# Patient Record
Sex: MALE | Race: WHITE | Employment: FULL TIME | URBAN - METROPOLITAN AREA
[De-identification: names, ages, dates, MRNs, and addresses within clinical notes are randomized per-mention and may not be internally consistent; named-entity substitution may affect disease eponyms.]

---

## 2018-09-23 ENCOUNTER — HOSPITAL ENCOUNTER (EMERGENCY)
Age: 63
Discharge: HOME | End: 2018-09-23
Attending: EMERGENCY MEDICINE
Payer: COMMERCIAL

## 2018-09-23 ENCOUNTER — APPOINTMENT (OUTPATIENT)
Dept: GENERAL RADIOLOGY | Age: 63
End: 2018-09-23
Attending: PHYSICIAN ASSISTANT
Payer: COMMERCIAL

## 2018-09-23 VITALS
OXYGEN SATURATION: 100 % | DIASTOLIC BLOOD PRESSURE: 102 MMHG | RESPIRATION RATE: 16 BRPM | WEIGHT: 210 LBS | HEIGHT: 73 IN | BODY MASS INDEX: 27.83 KG/M2 | HEART RATE: 63 BPM | SYSTOLIC BLOOD PRESSURE: 138 MMHG

## 2018-09-23 DIAGNOSIS — T25.222A PARTIAL THICKNESS BURN OF LEFT FOOT, INITIAL ENCOUNTER: Primary | ICD-10-CM

## 2018-09-23 LAB
BASOPHILS # BLD: 0 K/UL (ref 0–0.1)
BASOPHILS NFR BLD: 0 % (ref 0–2)
DIFFERENTIAL METHOD BLD: ABNORMAL
EOSINOPHIL # BLD: 0.1 K/UL (ref 0–0.4)
EOSINOPHIL NFR BLD: 1 % (ref 0–5)
ERYTHROCYTE [DISTWIDTH] IN BLOOD BY AUTOMATED COUNT: 13.2 % (ref 11.6–14.5)
HCT VFR BLD AUTO: 45.3 % (ref 36–48)
HGB BLD-MCNC: 15.7 G/DL (ref 13–16)
LYMPHOCYTES # BLD: 1.2 K/UL (ref 0.9–3.6)
LYMPHOCYTES NFR BLD: 16 % (ref 21–52)
MCH RBC QN AUTO: 31.1 PG (ref 24–34)
MCHC RBC AUTO-ENTMCNC: 34.7 G/DL (ref 31–37)
MCV RBC AUTO: 89.7 FL (ref 74–97)
MONOCYTES # BLD: 1.3 K/UL (ref 0.05–1.2)
MONOCYTES NFR BLD: 17 % (ref 3–10)
NEUTS SEG # BLD: 5 K/UL (ref 1.8–8)
NEUTS SEG NFR BLD: 66 % (ref 40–73)
PLATELET # BLD AUTO: 190 K/UL (ref 135–420)
PMV BLD AUTO: 9.7 FL (ref 9.2–11.8)
RBC # BLD AUTO: 5.05 M/UL (ref 4.7–5.5)
WBC # BLD AUTO: 7.6 K/UL (ref 4.6–13.2)

## 2018-09-23 PROCEDURE — 74011250637 HC RX REV CODE- 250/637: Performed by: PHYSICIAN ASSISTANT

## 2018-09-23 PROCEDURE — 75810000057 HC BURN CR DRS/DEB SM<5%TBSA

## 2018-09-23 PROCEDURE — 99282 EMERGENCY DEPT VISIT SF MDM: CPT

## 2018-09-23 PROCEDURE — 74011000250 HC RX REV CODE- 250: Performed by: PHYSICIAN ASSISTANT

## 2018-09-23 PROCEDURE — 90715 TDAP VACCINE 7 YRS/> IM: CPT | Performed by: PHYSICIAN ASSISTANT

## 2018-09-23 PROCEDURE — 74011250636 HC RX REV CODE- 250/636: Performed by: PHYSICIAN ASSISTANT

## 2018-09-23 PROCEDURE — 85025 COMPLETE CBC W/AUTO DIFF WBC: CPT

## 2018-09-23 PROCEDURE — 73630 X-RAY EXAM OF FOOT: CPT

## 2018-09-23 PROCEDURE — 90471 IMMUNIZATION ADMIN: CPT

## 2018-09-23 RX ORDER — CEPHALEXIN 250 MG/1
500 CAPSULE ORAL
Status: COMPLETED | OUTPATIENT
Start: 2018-09-23 | End: 2018-09-23

## 2018-09-23 RX ORDER — CEPHALEXIN 500 MG/1
500 CAPSULE ORAL 4 TIMES DAILY
Qty: 28 CAP | Refills: 0 | Status: SHIPPED | OUTPATIENT
Start: 2018-09-23 | End: 2018-09-30

## 2018-09-23 RX ORDER — SILVER SULFADIAZINE 10 G/1000G
CREAM TOPICAL
Status: COMPLETED | OUTPATIENT
Start: 2018-09-23 | End: 2018-09-23

## 2018-09-23 RX ORDER — METOPROLOL SUCCINATE 100 MG/1
TABLET, EXTENDED RELEASE ORAL DAILY
COMMUNITY

## 2018-09-23 RX ORDER — LOSARTAN POTASSIUM 25 MG/1
TABLET ORAL DAILY
COMMUNITY

## 2018-09-23 RX ORDER — SILVER SULFADIAZINE 10 G/1000G
CREAM TOPICAL 2 TIMES DAILY
Qty: 50 G | Refills: 0 | Status: SHIPPED | OUTPATIENT
Start: 2018-09-23 | End: 2018-10-03

## 2018-09-23 RX ADMIN — TETANUS TOXOID, REDUCED DIPHTHERIA TOXOID AND ACELLULAR PERTUSSIS VACCINE, ADSORBED 0.5 ML: 5; 2.5; 8; 8; 2.5 SUSPENSION INTRAMUSCULAR at 18:03

## 2018-09-23 RX ADMIN — SILVER SULFADIAZINE: 10 CREAM TOPICAL at 17:12

## 2018-09-23 RX ADMIN — CEPHALEXIN 500 MG: 250 CAPSULE ORAL at 17:10

## 2018-09-23 NOTE — DISCHARGE INSTRUCTIONS
[facility]: Care Instructions  Your Care Instructions    Burns injure the skin and can also injure other parts of your body, such as your muscles, nerves, lungs, and eyes. Burns may also become infected easily. Pain from a burn may get worse in the first few weeks as the burn heals. The color, texture, and feel of your skin will change as new skin and scar tissue form. You may notice that the burned area feels tight and hard while it is healing. It is important to continue to move the area as the burn heals to prevent loss of motion or loss of function in the area. Complete healing of a burn may take from a few months to up to a year. Recovering from a burn can be a painful and trying process, but there are steps you can take to make sure that the burn heals as well as possible. Follow-up care is a key part of your treatment and safety. Be sure to make and go to all appointments, and call your doctor if you are having problems. It's also a good idea to know your test results and keep a list of the medicines you take. How can you care for yourself at home? · Follow your doctor's instructions for caring for your burn. You may need special bandages or a compression garment if you have a very deep burn. · Keep your burn clean and dry. ¨ Wash the burn every day with a mild soap and water. ¨ Gently pat the burn dry after you wash and rinse it. · Protect your burn while it is healing. Cover your burn if you are going out in the cold or the sun. ¨ If the burn is on your hands or arms, wear long sleeves. ¨ Wear a hat if the burn is on your face. ¨ Wear shoes and socks if the burn is on your feet. · If your doctor prescribed antibiotics, take them as directed. Do not stop taking them just because you feel better. You need to take the full course of antibiotics. · Take an over-the-counter pain medicine, such as acetaminophen (Tylenol), ibuprofen (Advil, Motrin), or naproxen (Aleve), as needed.  Read and follow all instructions on the label. Do not use aspirin, because it can make bleeding in the burned area worse. · Do not take two or more pain medicines at the same time unless the doctor told you to. Many pain medicines have acetaminophen, which is Tylenol. Too much acetaminophen (Tylenol) can be harmful. · Do not scratch your burn. Talk to your doctor about what to use on your burn for itching. · Drink plenty of fluids. If you have kidney, heart, or liver disease and have to limit fluids, talk with your doctor before you increase the amount of fluids you drink. · Eat a healthy diet. Make sure that you are eating foods that have enough calories and protein to promote healing. Ask your doctor if you should take any extra vitamins or other supplements. · Do not smoke. Smoking slows healing and delays tissue repair. If you need help quitting, talk to your doctor about stop-smoking programs and medicines. These can increase your chances of quitting for good. When should you call for help? Call your doctor now or seek immediate medical care if:    · You have signs of infection, such as:  ¨ Increased pain, swelling, warmth, or redness. ¨ Red streaks leading from the burn. ¨ Pus draining from the burn. ¨ A fever.     · You cannot move the burned area, or the area feels numb.    Watch closely for changes in your health, and be sure to contact your doctor if:    · You do not get better as expected. Where can you learn more? Go to http://angel-slick.info/. Enter W647 in the search box to learn more about \"[facility]: Care Instructions. \"  Current as of: November 20, 2017  Content Version: 11.7  © 0118-5886 [employer]. Care instructions adapted under license by [employer] (which disclaims liability or warranty for this information).  If you have questions about a medical condition or this instruction, always ask your healthcare professional. Clara Jean Baptiste disclaims any warranty or liability for your use of this information.

## 2018-09-23 NOTE — LETTER
[facility] EMERGENCY DEPT 
[facility] [address] [id number] 
[phone] Work/School Note Date: 9/23/2018 To Whom It May concern: 
 
Josr Islas was seen and treated today in the emergency room by the following provider(s): 
Attending Provider: Tico Herbert MD 
Physician Assistant: Aaron Basurto PA-C. Josr Islas may return to work on 9/24/2018. Sincerely, Aaron Basurto PA-C

## 2018-09-23 NOTE — ED NOTES
I have reviewed discharge instructions with the patient. The patient verbalized understanding. Patient armband removed and given to patient to take home. Patient was informed of the privacy risks if armband lost or stolen

## 2018-09-23 NOTE — ED NOTES
TRANSFER - ED to INPATIENT REPORT: 
 
SBAR report made available to receiving floor on this patient being transferred to  [phone] /[id number])  for routine progression of care Admitting diagnosis There are no admission diagnoses documented for this encounter. Information from the following report(s) SBAR was made available to receiving floor. Lines:    
 
Medication list unable to confirm Opportunity for questions and clarification was provided. Patient is disoriented. Patient is  continent and ambulatory with assist 
  
Valuables transported with patient Patient transported with: 
 Monitor O2 @ 15 liters on BIPAP With general surgery, respiratory, and anesthesia to OR for advanced airway placement prior to ICU admission.
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
## 2023-02-16 ENCOUNTER — HOSPITAL ENCOUNTER (OUTPATIENT)
Facility: HOSPITAL | Age: 68
Setting detail: RECURRING SERIES
Discharge: HOME | End: 2023-02-19
Payer: MEDICARE

## 2023-02-16 PROCEDURE — 97110 THERAPEUTIC EXERCISES: CPT

## 2023-02-16 PROCEDURE — 97162 PT EVAL MOD COMPLEX 30 MIN: CPT

## 2023-02-16 NOTE — PROGRESS NOTES
[facility] PHYSICAL THERAPY  [address] [place] [address]. [facility] [address]. [facility] Phone: [phone] Fax   Plan of Care / Statement of Necessity for Physical Therapy Services     Patient Name: Lucille Beard : 1955   Medical   Diagnosis: Chronic midline low back pain without sciatica Treatment Diagnosis: Other low back pain [M54.59]   Onset Date: chronic     Referral Source: Benjamin Stubbs MD Start of Care [facility]): 2023   Prior Hospitalization: See medical history Provider #: [id number]   Prior Level of Function: independent    Comorbidities: Arthritis, HTN, hepatitis   Medications: Verified on Patient Summary List     Assessment / key information:  Patient is a 74year old male with chief complaint of low back pain. He reports a history of low back pain for many years and reports bending forward seems to be the most aggravating factor. He reports pain is also increases with sitting and eased by moving. Pain limits him from completing his normal house hold activities and regular exercise routine.     Objective findings: 1) decreased lumbar lordosis and thoracic kyphosis, 2) increased muscle turgor noted in B thoracic and lumbar paraspinals, 3) form/force closure (+) B, 4) decreased lumbar AROM, 5) decreased abdominal, core, and B LE strength, 6) 90/90 hamstring test: 40 deg on right/30 deg on left, 7) Ely test (+) B    Active Movements: [] N/A   [] Too acute   [] Other:  ROM % AROM % PROM Comments:pain, area   Forward flexion 40-60 75%   Difficulty returning to upright position   Extension 20-30 25%       SB right 20-30 25%       SB left 20-30 25%         Strength    L(0-5) R (0-5) N/T   Hip Flexion (L1,2) 4- 4- []    Knee Extension (L3,4) 5 5 []    Ankle Dorsiflexion (L4) 5 5 []    Great Toe Extension (L5)     []    Ankle Plantarflexion (S1)     []    Knee Flexion (S1,2) 4 4 []    Upper Abdominals 3 3 []    Lower Abdominals 3 3 []    Paraspinals []    Glutes Medius 4+ 4- []    Gluteus Dale 3 3 []    Other     []       Evaluation Complexity:  History:  HIGH Complexity :3+ comorbidities / personal factors will impact the outcome/ POC ; Examination:  MEDIUM Complexity : 3 Standardized tests and measures addressin body structure, function, activity limitation and / or participation in recreation  ;Presentation:  MEDIUM Complexity : Evolving with changing characteristics  ; Clinical Decision Making:  MEDIUM Complexity : FOTO score of 26-74 FOTO score = an established functional score where 100 = no disability  Overall Complexity Rating: MEDIUM  Problem List: pain affecting function, decrease ROM, decrease strength, impaired gait/balance, decrease ADL/functional abilities, decrease activity tolerance, and decrease flexibility/joint mobility   Treatment Plan may include any combination of the followin Therapeutic Exercise, 16025 Neuromuscular Re-Education, 48488 Manual Therapy, 58943 Therapeutic Activity, 59848 Self Care/Home Management, and 07682 Electrical Stim unattended  Patient / Family readiness to learn indicated by: asking questions, trying to perform skills, interest, and return verbalization   Persons(s) to be included in education: patient (P)  Barriers to Learning/Limitations: none  Measures taken if barriers to learning present: NA  Patient Goal (s): \"to learn to do exercises helpful to my problem\"  Patient Self Reported Health Status: good  Rehabilitation Potential: good    Short Term Goals: To be accomplished in 2-4 weeks   Patient will be compliant with home exercise program to promote gains with therapy. Status at IE: HEP issued  2. Patient will demonstrate B hip extension strength to at least 3+/5 to improve dynamic stability with gait, ADL's, and functional mobility. Status at IE: 3/5 B  3. Patient will demonstrate 90/90 hamstring test to 30 deg or less on the right LE to improve positioning and activity tolerance.    Status at IE: 40 deg    Long Term Goals: To be accomplished in 4-8 weeks   Patient will be independent with his/her home exercise program to maintain gains of therapy. Status at IE: HEP issued  2. Patient will demonstrate B hip extension strength to at least 4/5 to improve dynamic stability with gait, ADL's, and functional mobility. Status at IE: 3/5 B  3. Patient will demonstrate 90/90 hamstring test to 25 deg or less B to improve positioning and activity tolerance. Status at IE: 40 deg right/30 deg left  4. Patient will score at least 62/100 on FOTO (Focused on Therapeutic Outcomes) functional status score to indicate improved activity tolerance  Status at IE: 47/100    Frequency / Duration: Patient would benefit from skilled PT 2 times per week for up to 36 sessions as needed in this certification period. Goals will be assigned and reassessed every 10 visits/ 30 days per guidelines . Patient/ Caregiver education and instruction: Diagnosis, prognosis, self care and exercises [x]  Plan of care has been reviewed with PTA    Certification Period: 2/16/2023 - 5/15/2023    Miguel Crowe, PT       2/16/2023       11:12 AM  ===================================================================  I certify that the above Therapy Services are being furnished while the patient is under my care. I agree with the treatment plan and certify that this therapy is necessary. Physician's Signature:_________________________   DATE:_________   TIME:________                           Nadege Kraus MD    ** Signature, Date and Time must be completed for valid certification **  Please sign and return to [facility] or you may fax the signed copy to [phone]. Thank you. To ensure your patient receives the highest quality care and to avoid disruption in therapy please sign and return this plan of care within 21 days.   Per Medicaid guidelines if the plan of care is not received within 21 days the patient's care must be put on hold until signed.

## 2023-02-16 NOTE — PROGRESS NOTES
PT DAILY TREATMENT NOTE/LUMBAR EVAL     Patient Name: Josue Anton    Date: 2023    : 1955  Insurance: Payor: MEDICARE / Plan: MEDICARE PART A AND B / Product Type: *No Product type* /      Patient  verified yes     Visit #   Current / Total 1 8-16   Time   In / Out 9:10 10:00   Pain   In / Out 4/10 4/10   Subjective Functional Status/Changes: NA   Changes to:  Meds, Allergies, Med Hx, Sx Hx? If yes, update Summary List no         Treatment Area: Other low back pain [M54.59]  SUBJECTIVE  Pain Level (0-10 scale): 4  [x]constant []intermittent []improving []worsening []no change since onset    Any medication changes, allergies to medications, adverse drug reactions, diagnosis change, or new procedure performed?: [x] No    [] Yes (see summary sheet for update)  Subjective functional status/changes:     PLOF: independent ADL's/ambulation  Limitations to PLOF: chronic LBP  Mechanism of Injury: none  Current symptoms/Complaints: LBP and B hip pain; pain varies in intensity  Previous Treatment/Compliance: none  PMHx/Surgical Hx: hernia repair  Work Hx:   Living Situation:   Pt Goals: see POC  Barriers: []pain []financial []time []transportation []other  Motivation: good  Substance use: []Alcohol []Tobacco []other:   FABQ Score: []low []elevate  Cognition: A & O x 3    Other:    OBJECTIVE/EXAMINATION  Domestic Life:   Activity/Recreational Limitations: exercising > 3x/week and NFWC  Mobility: independent ambulation  Self Care: independent       40 min []Eval        - untimed        Therapeutic Procedures: Tx Min Billable or 1:1 Min (if diff from Tx Min) Procedure, Rationale, Specifics   10  [id number] Therapeutic Exercise (timed):  increase ROM, strength, coordination, balance, and proprioception to improve patient's ability to progress to PLOF and address remaining functional goals. (see flow sheet as applicable)     Details if applicable:      Total  10 Total Reminder: MC/BC bill using total billable min of TIMED therapeutic procedures (example: do not include dry needle or estim unattended, both untimed codes, in totals to left)     [x]  Patient Education billed concurrently with other procedures     Other Objective/Functional Measures:     Physical Therapy Evaluation - Lumbar Spine ([facility])    SUBJECTIVE  Chief Complaint:    Mechanism of injury:    Symptoms:  Aggravated by:   [] Bending [x] Sitting [] Standing [] Walking   [] Moving [] Cough [] Sneeze [] Valsalva   [] AM  [] PM  Lying:  [] sup   [] pro   [] sidelying   [] Other:     Eased by:    [] Bending [] Sitting [] Standing [] Walking   [x] Moving [] AM  [] PM  Lying: [] sup  [] pro  [] sidelying   [] Other:      Past History/Treatments: none    Diagnostic Tests: [] Lab work [x] X-rays    [] CT [x] MRI     [] Other:  Results: unknown    Functional Status  Prior level of function:  Present functional limitations:  What position do you sleep in?:    OBJECTIVE  Posture:  Lateral Shift: [] R    [] L     [] +  [x] -  Kyphosis: [] Increased [x] Decreased   []  WNL  Lordosis:  [] Increased [x] Decreased   [] WNL  Pelvic symmetry: [] WNL    [] Other:    Gait:  [] Normal     [] Abnormal:    Active Movements: [] N/A   [] Too acute   [] Other:  ROM % AROM % PROM Comments:pain, area   Forward flexion 40-60 75%  Difficulty returning to upright position   Extension 20-30 25%     SB right 20-30 25%     SB left 20-30 25%     Rotation right 5-10      Rotation left 5-10        Repeated Movements   Effects on present pain: produces (IA), abolishes (A), increases (incr), decreases (decr), centralizes (C), peripheral (PH), no effect (NE)   Pre-Test Sx Flexion Repeated Flexion Extension Repeated Extension Repeated SBL Repeated SBR   Sitting          Standing          Lying      N/A N/A   Comments:  Side Glide:  Sustained passive positioning test:    Neuro Screen [x] WNL  Myotome/Dermatome/Reflexes:  Comments:    Dural Mobility:  SLR Sitting: [] R    [] L    [] +    [] -  @ (degrees):           Supine: [] R    [] L    [] +    [] -  @ (degrees):   Slump Test: [] R    [] L    [] +    [] -  @ (degrees):   Prone Knee Bend: [] R    [] L    [] +    [] -     Palpation: increased muscle turgor noted in B thoracic and lumbar paraspinals (left > right)  [] Min  [] Mod  [] Severe    Location:   [] Min  [] Mod  [] Severe    Location:  [] Min  [] Mod  [] Severe    Location:    Strength   L(0-5) R (0-5) N/T   Hip Flexion (L1,2) 4- 4- []   Knee Extension (L3,4) 5 5 []   Ankle Dorsiflexion (L4) 5 5 []   Great Toe Extension (L5)   []   Ankle Plantarflexion (S1)   []   Knee Flexion (S1,2) 4 4 []   Upper Abdominals 3 3 []   Lower Abdominals 3 3 []   Paraspinals   []   Glutes Medius 4+ 4- []   Gluteus Dale 3 3 []   Other   []     Special Tests  Lumbar:  Lumb. Compression: [] Pos  [] Neg               Lumbar Distraction:   [] Pos  [] Neg    Quadrant:  [] Pos  [] Neg   [] Flex  [] Ext    Sacroilliac:  Gaenslen's: [] R    [] L    [] +    [] -     Compression: [] +    [] -     Gapping:  [] +    [] -     Thigh Thrust: [] R    [] L    [] +    [] -     Leg Length: [] +    [] -   Position:    Crests:    ASIS: level in standing    PSIS: level in standing    Sacral Sulcus:    Mobility: Standing flex:     Sitting flex:     Supine to sit:     Prone knee bend:         Hip: Dimitri Mins:  [] R    [] L    [] +    [] -     Scour:  [] R    [] L    [] +    [] -     Piriformis: [] R    [] L    [] +    [] -          Deficits: Yancy's: [] R    [] L    [] +    [] -     Kelvin: [] R    [] L    [] +    [] -     Hamstrings 90/90: right 40 deg/left 30 deg    Gastrocsoleus (to neutral): Right: Left:    Form/force closure: (+) B    Ely test: (+) B       Global Muscular Weakness:  Abdominals:  Quadratus Lumborum:  Paraspinals:   Other:    Other tests/comments:       Pain Level (0-10 scale) post treatment: 4    ASSESSMENT/Changes in Function: See POC    Patient will benefit from skilled PT services to modify and progress therapeutic interventions, analyze and address functional mobility deficits, analyze and address ROM deficits, analyze and address strength deficits, analyze and address soft tissue restrictions, analyze and cue for proper movement patterns, analyze and modify for postural abnormalities, and instruct in home and community integration to address functional deficits and attain remaining goals.     [x]  See Plan of Care - for goals and assessment     PLAN  [x]  Upgrade activities as tolerated     []  Continue plan of care  []  Update interventions per flow sheet       []  Other:_      Jm Brumfield, PT 2/16/2023  9:15 AM    Justification for Eval Code Complexity:  Patient History : high   Examination see exam mod   Clinical Presentation: mod  Clinical Decision Making : FOTO : 47 /100

## 2023-02-20 ENCOUNTER — HOSPITAL ENCOUNTER (OUTPATIENT)
Facility: HOSPITAL | Age: 68
Setting detail: RECURRING SERIES
Discharge: HOME | End: 2023-02-23
Payer: MEDICARE

## 2023-02-20 PROCEDURE — 97110 THERAPEUTIC EXERCISES: CPT

## 2023-02-20 PROCEDURE — 97530 THERAPEUTIC ACTIVITIES: CPT

## 2023-02-20 NOTE — PROGRESS NOTES
PHYSICAL / OCCUPATIONAL THERAPY - DAILY TREATMENT NOTE (updated )    Patient Name: Pilo Cook    Date: 2023    : 1955  Insurance: Payor: MEDICARE / Plan: MEDICARE PART A AND B / Product Type: *No Product type* /      Patient  verified yes     Visit #   Current / Total 2 8-16   Time   In / Out 430 528   Pain   In / Out 4/10 010   Subjective Functional Status/Changes: \"I always have a little bit of pain. I am really trying to get the core and back stronger\"   Changes to:  Meds, Allergies, Med Hx, Sx Hx?  If yes, update Summary List no       TREATMENT AREA =  Other low back pain [M54.59]    OBJECTIVE    Modalities Rationale:     decrease pain to improve patient's ability to progress to PLOF and address remaining functional goals.    10 min  unbill []  Ice     [x]  Heat    location/position: Supine with wedge under B LEs     [x]Skin assessment post-treatment (if applicable):   [x]  intact    []  redness- no adverse reaction                 []redness - adverse reaction:        Therapeutic Procedures:  Tx Min Billable or 1:1 Min (if diff from Tx Min) Procedure, Rationale, Specifics   36 23 [id number] Therapeutic Exercise (timed):  increase ROM, strength, coordination, balance, and proprioception to improve patient's ability to progress to PLOF and address remaining functional goals. (see flow sheet as applicable)     Details if applicable:        01727 Therapeutic Activity (timed):  use of dynamic activities replicating functional movements to increase ROM, strength, coordination, balance, and proprioception in order to improve patient's ability to progress to PLOF and address remaining functional goals.  (see flow sheet as applicable)     Details if applicable:     48 35 [facility] Totals Reminder: bill using total billable min of TIMED therapeutic procedures (example: do not include dry needle or estim unattended, both untimed codes, in totals to left)  8-22 min = 1 unit; 23-37 min = 2 units;  38-52 min = 3 units; 53-67 min = 4 units; 68-82 min = 5 units   Total Total     [x]  Patient Education billed concurrently with other procedures   [x] Review HEP    [] Progressed/Changed HEP, detail:    [] Other detail:       Objective Information/Functional Measures/Assessment    Initiated TE per flowsheet;  no c/o pain noted at this time  VCs + demo to perform proper technique and for safety with TE  demos poor posture; improved with VCs  demos proper bed mobility with instruction      Patient will continue to benefit from skilled PT / OT services to modify and progress therapeutic interventions, analyze and address functional mobility deficits, analyze and address ROM deficits, analyze and address strength deficits, analyze and address soft tissue restrictions, analyze and cue for proper movement patterns, analyze and modify for postural abnormalities, and instruct in home and community integration to address functional deficits and attain remaining goals.     Progress toward goals / Updated goals:  []  See Progress Note/Recertification    Pt's first visit since [facility], no noted progress       PLAN  yes Continue plan of care  [x]  Upgrade activities as tolerated  []  Discharge due to :  []  Other:    Timothy Mathew, PTA    2/20/2023    5:55 PM    Future Appointments   Date Time Provider Minnie Rodas   2/22/2023  4:30 PM Annabell Claude, PT [facility]   2/28/2023  4:30 PM Hillary Colby, PT [facility]   3/2/2023  4:30 PM Supriya Magana, PT [facility]   3/6/2023  4:30 PM Eliceo Johnsno PTA [facility]   3/8/2023  4:30 PM Annabell Claude, PT [facility]   3/14/2023  4:30 PM Hillary Colby, PT [facility]   3/16/2023  4:30 PM Supriya Magana, PT [facility]   3/20/2023  4:30 PM Eliceo Johnson, ROX [facility]   3/23/2023  4:30 PM Supriya Magana, PT [facility]   3/27/2023  4:30 PM Eliceo Johnson PTA [facility]   3/30/2023  4:30 PM Supriya Magana, PT [facility]

## 2023-02-22 ENCOUNTER — HOSPITAL ENCOUNTER (OUTPATIENT)
Facility: HOSPITAL | Age: 68
Setting detail: RECURRING SERIES
Discharge: HOME | End: 2023-02-25
Payer: MEDICARE

## 2023-02-22 PROCEDURE — 97112 NEUROMUSCULAR REEDUCATION: CPT

## 2023-02-22 PROCEDURE — 97110 THERAPEUTIC EXERCISES: CPT

## 2023-02-22 NOTE — PROGRESS NOTES
PHYSICAL / OCCUPATIONAL THERAPY - DAILY TREATMENT NOTE (updated )    Patient Name: Kel Nobles    Date: 2023    : 1955  Insurance: Payor: MEDICARE / Plan: MEDICARE PART A AND B / Product Type: *No Product type* /      Patient  verified YES    Visit #   Current / Total 3 8-16   Time   In / Out 427 508   Pain   In / Out 3 41   Subjective Functional Status/Changes: Denies any adverse effects following last session and symptoms improved since. Changes to:  Meds, Allergies, Med Hx, Sx Hx? If yes, update Summary List no       TREATMENT AREA =  Other low back pain [M54.59]    OBJECTIVE      Therapeutic Procedures: Tx Min Billable or 1:1 Min (if diff from Tx Min) Procedure, Rationale, Specifics   31 58 [id number] Therapeutic Exercise (timed):  increase ROM, strength, coordination, balance, and proprioception to improve patient's ability to progress to PLOF and address remaining functional goals. (see flow sheet as applicable)     Details if applicable:    See flowsheet     10 35 61867 Neuromuscular Re-Education (timed):  improve balance, coordination, kinesthetic sense, posture, core stability and proprioception to improve patient's ability to develop conscious control of individual muscles and awareness of position of extremities in order to progress to PLOF and address remaining functional goals.  (see flow sheet as applicable)     Details if applicable:    See flowsheet   39 41 [facility] Totals Reminder: bill using total billable min of TIMED therapeutic procedures (example: do not include dry needle or estim unattended, both untimed codes, in totals to left)  8-22 min = 1 unit; 23-37 min = 2 units; 38-52 min = 3 units; 53-67 min = 4 units; 68-82 min = 5 units   Total Total     [x]  Patient Education billed concurrently with other procedures   [x] Review HEP    [] Progressed/Changed HEP, detail:    [] Other detail:       Objective Information/Functional Measures/Assessment    Pt tolerated session well and demonstrated minimal to no apprehension during movements. Required moderate cuing throughout session. Continuing to progress through core stabilization exercises and hip strengthening movements. Responding well to current POC. Patient will continue to benefit from skilled PT / OT services to modify and progress therapeutic interventions, analyze and address functional mobility deficits, analyze and address ROM deficits, analyze and address strength deficits, analyze and address soft tissue restrictions, analyze and cue for proper movement patterns, analyze and modify for postural abnormalities, and instruct in home and community integration to address functional deficits and attain remaining goals. Progress toward goals / Updated goals:  []  See Progress Note/Recertification    Reports compliance with HEP.  MET STG1    PLAN  yes Continue plan of care  []  Upgrade activities as tolerated  []  Discharge due to :  []  Other:    Angeli Carballo, PT    2/22/2023    4:58 PM    Future Appointments   Date Time Provider Minnie Rodas   2/28/2023  4:30 PM Saturnino Castle, PT [facility] [address]   3/2/2023  4:30 PM Sonny Mott, PT [facility] [address]   3/6/2023  4:30 PM Enzo Guillaume, PTA [facility] [address]   3/8/2023  4:30 PM Angeli Carballo, PT [facility] [address]   3/14/2023  4:30 PM Saturnino Castle, PT [facility] [address]   3/16/2023  4:30 PM Sonny Mott, PT [facility] [address]   3/20/2023  4:30 PM Enzo Guillaume PTA [facility] [address]   3/23/2023  4:30 PM Sonny Tiera, PT [facility] [address]   3/27/2023  4:30 PM Enzo Guillaume, PTA [facility] [address]   3/30/2023  4:30 PM Sonny Tiera, PT [facility] [address]

## 2023-02-28 ENCOUNTER — HOSPITAL ENCOUNTER (OUTPATIENT)
Facility: HOSPITAL | Age: 68
Setting detail: RECURRING SERIES
Discharge: HOME | End: 2023-03-03
Payer: MEDICARE

## 2023-02-28 PROCEDURE — 97110 THERAPEUTIC EXERCISES: CPT

## 2023-02-28 PROCEDURE — 97530 THERAPEUTIC ACTIVITIES: CPT

## 2023-02-28 NOTE — PROGRESS NOTES
PHYSICAL / OCCUPATIONAL THERAPY - DAILY TREATMENT NOTE (updated )    Patient Name: Landen Chen    Date: 2023    : 1955  Insurance: Payor: MEDICARE / Plan: MEDICARE PART A AND B / Product Type: *No Product type* /      Patient  verified yes     Visit #   Current / Total 4 8-16   Time   In / Out 4:28 5:26   Pain   In / Out 3/10 0/10   Subjective Functional Status/Changes: \"I had some pain after last session for a few hours but it only lasted for a few hours. I think it may have helped though because a few days later the pain was only mild. \"   Changes to:  Meds, Allergies, Med Hx, Sx Hx? If yes, update Summary List no       TREATMENT AREA =  Other low back pain [M54.59]    OBJECTIVE    Modalities Rationale:     decrease pain and increase tissue extensibility to improve patient's ability to progress to PLOF and address remaining functional goals. min [] Estim Unattended, type/location:                                                            []  w/ice    []  w/heat     min [] Estim Attended, type/location:                                                           []  w/US     []  w/ice    []  w/heat    []  TENS insruct       min []  Mechanical Traction: type/lbs                    []  pro   []  sup   []  int   []  cont    []  before manual    []  after manual     min []  Ultrasound, settings/location:        min []  Iontophoresis w/ dexamethasone, location:                                                []  take home patch       []  in clinic   10 min  unbill []  Ice     [x]  Heat    location/position: MHP to LB in supine with B LE's elevated on wedge     min []  Paraffin,  details:       min []  Vasopneumatic Device, press/temp:       min []  Hollace Band / Lei Douse:      If using vaso (only need to measure limb vaso being performed on)      pre-treatment girth :       post-treatment girth :       measured at (landmark location) :       min []  Other:     Skin assessment post-treatment (if applicable):    [x]  intact    [x]  redness- no adverse reaction                 []redness - adverse reaction:        Therapeutic Procedures: Tx Min Billable or 1:1 Min (if diff from Tx Min) Procedure, Rationale, Specifics   33  [id number] Therapeutic Exercise (timed):  increase ROM, strength, coordination, balance, and proprioception to improve patient's ability to progress to PLOF and address remaining functional goals. (see flow sheet as applicable)     Details if applicable:       15  [id number] Therapeutic Activity (timed):  use of dynamic activities replicating functional movements to increase ROM, strength, coordination, balance, and proprioception in order to improve patient's ability to progress to PLOF and address remaining functional goals. (see flow sheet as applicable)     Details if applicable:  [employer]ougherty marches/KARTHIK on [employer]   50  [facility] Totals Reminder: bill using total billable min of TIMED therapeutic procedures (example: do not include dry needle or estim unattended, both untimed codes, in totals to left)  8-22 min = 1 unit; 23-37 min = 2 units; 38-52 min = 3 units; 53-67 min = 4 units; 68-82 min = 5 units   Total Total     [x]  Patient Education billed concurrently with other procedures   [x] Review HEP    [x] Progressed/Changed HEP, detail: per handouts in chart  [] Other detail:     Objective Information/Functional Measures/Assessment    Progressed exercises per flowsheet. Verbal cues for correct positioning/technique with several exercises.     Patient will continue to benefit from skilled PT / OT services to modify and progress therapeutic interventions, analyze and address functional mobility deficits, analyze and address ROM deficits, analyze and address strength deficits, analyze and address soft tissue restrictions, analyze and cue for proper movement patterns, analyze and modify for postural abnormalities, and instruct in home and community integration to address functional deficits and attain remaining goals. Progress toward goals / Updated goals:  []  See Progress Note/Recertification    Short Term Goals: To be accomplished in 2-4 weeks   Patient will be compliant with home exercise program to promote gains with therapy. Status at IE: HEP issued  Current status: progressed HEP per handout  2. Patient will demonstrate B hip extension strength to at least 3+/5 to improve dynamic stability with gait, ADL's, and functional mobility. Status at IE: 3/5 B  3. Patient will demonstrate 90/90 hamstring test to 30 deg or less on the right LE to improve positioning and activity tolerance.    Status at IE: 40 deg  Current status: began hamstring stretches    PLAN  yes Continue plan of care  [x]  Upgrade activities as tolerated  []  Discharge due to :  []  Other:    Dewayne Richey, PT    2/28/2023    4:31 PM    Future Appointments   Date Time Provider Minnie Rodas   3/2/2023  4:30 PM Jovan Curet, PT [place] [address]   3/6/2023  4:30 PM Migueloctavio Hugo, PTA [facility] [address]   3/8/2023  4:30 PM Daija George, PT [facility] [address]   3/14/2023  4:30 PM Dewayne Richey, PT [facility] [address]   3/16/2023  4:30 PM Jovan Curet, PT [facility] [address]   3/20/2023  4:30 PM Miguel Savory, PTA [facility] [address]   3/23/2023  4:30 PM Jovan Curet, PT [facility] [address]   3/27/2023  4:30 PM Miguel Savory, PTA [facility] [address]   3/30/2023  4:30 PM Jovan Curet, PT [facility] [address]

## 2023-03-02 ENCOUNTER — HOSPITAL ENCOUNTER (OUTPATIENT)
Facility: HOSPITAL | Age: 68
Setting detail: RECURRING SERIES
Discharge: HOME | End: 2023-03-05
Payer: MEDICARE

## 2023-03-02 PROCEDURE — 97112 NEUROMUSCULAR REEDUCATION: CPT

## 2023-03-02 PROCEDURE — 97530 THERAPEUTIC ACTIVITIES: CPT

## 2023-03-02 NOTE — PROGRESS NOTES
PHYSICAL / OCCUPATIONAL THERAPY - DAILY TREATMENT NOTE (updated )    Patient Name: Ade Winslow    Date: 3/2/2023    : 1955  Insurance: Payor: MEDICARE / Plan: MEDICARE PART A AND B / Product Type: *No Product type* /      Patient  verified yes     Visit #   Current / Total 5 8-16   Time   In / Out 4:26 5:20   Pain   In / Out 4 4   Subjective Functional Status/Changes: Pt reports 4/10 pain. Changes to:  Meds, Allergies, Med Hx, Sx Hx? If yes, update Summary List no       TREATMENT AREA =  Other low back pain [M54.59]    OBJECTIVE    Therapeutic Procedures: Tx Min Billable or 1:1 Min (if diff from Tx Min) Procedure, Rationale, Specifics   [phone] [id number] Therapeutic Exercise (timed):  increase ROM, strength, coordination, balance, and proprioception to improve patient's ability to progress to PLOF and address remaining functional goals. (see flow sheet as applicable)     Details if applicable:  NS, LE stretches     27 [phone] [id number] Neuromuscular Re-Education (timed):  improve balance, coordination, kinesthetic sense, posture, core stability and proprioception to improve patient's ability to develop conscious control of individual muscles and awareness of position of extremities in order to progress to PLOF and address remaining functional goals. (see flow sheet as applicable)     Details if applicable: Pallof press, clam, SLR 3-way, SB 0/1, quad LE ext   19 15 [id number] Therapeutic Activity (timed):  use of dynamic activities replicating functional movements to increase ROM, strength, coordination, balance, and proprioception in order to improve patient's ability to progress to PLOF and address remaining functional goals.   (see flow sheet as applicable)     Details if applicable: Kami t-band row, DD LAQ/ 38 [facility] Totals Reminder: bill using total billable min of TIMED therapeutic procedures (example: do not include dry needle or estim unattended, both untimed codes, in totals to left)  8-22 min = 1 unit; 23-37 min = 2 units; 38-52 min = 3 units; 53-67 min = 4 units; 68-82 min = 5 units   Total Total     [x]  Patient Education billed concurrently with other procedures   [x] Review HEP    [] Progressed/Changed HEP, detail:    Access Code: [email or web address]  URL: [email or web address]  Date: 03/02/2023  Prepared by: Valnecia Cardenas    Exercises  Modified Faylene Gifty Stretch - 1 x daily - 7 x weekly - 1 sets - 2-3 reps - 30 second hold  Dead Bug - 1 x daily - 7 x weekly - 1 sets - 10 reps  Supine Straight Leg Raises - 1 x daily - 7 x weekly - 1 sets - 10 reps - 3 second hold  Sidelying Hip Abduction - 1 x daily - 7 x weekly - 1 sets - 10 reps - 3 second hold  Prone Hip Extension - 1 x daily - 7 x weekly - 1 sets - 10 reps - 3 second hold  [] Other detail:       Objective Information/Functional Measures/Assessment    Exercises per flow sheet    Added S/L hip abd, prone hip ext, quad LE ext, SB 0/1 and modified Kelvin stretch    Added UE movements (UE at side to UE at 90 deg shoulder flexion) with LE movements (H/L to 90/90 hip/knee flex)    Patient will continue to benefit from skilled PT / OT services to modify and progress therapeutic interventions, analyze and address functional mobility deficits, analyze and address ROM deficits, analyze and address strength deficits, analyze and address soft tissue restrictions, analyze and cue for proper movement patterns, analyze and modify for postural abnormalities, analyze and address imbalance/dizziness, and instruct in home and community integration to address functional deficits and attain remaining goals. Progress toward goals / Updated goals:  []  See Progress Note/Recertification    Progressing toward goals:  Short Term Goals: To be accomplished in 2-4 weeks   Patient will be compliant with home exercise program to promote gains with therapy. Status at IE: HEP issued         Current status: Cont HEP per handout  2.   Patient will demonstrate B hip extension strength to at least 3+/5 to improve dynamic stability with gait, ADL's, and functional mobility. Status at IE: 3/5 B    Current status: Cont bridge  3. Patient will demonstrate 90/90 hamstring test to 30 deg or less on the right LE to improve positioning and activity tolerance.    Status at IE: 40 deg                 Current status: Cont hamstring stretches    PLAN  yes Continue plan of care  [x]  Upgrade activities as tolerated  []  Discharge due to :  []  Other:    Chin Hull PT    3/2/2023    4:43 PM    Future Appointments   Date Time Provider Minnie Rodas   3/6/2023  4:30 PM Thomas Be PTA [facility]   3/8/2023  4:30 PM Livia Pickering, PT [facility]   3/14/2023  4:30 PM Adrian Doan, PT [facility]   3/16/2023  4:30 PM Chin Hull, PT [facility]   3/20/2023  4:30 PM Thomas Be PTA [facility]   3/23/2023  4:30 PM Chin Hull PT [facility]   3/27/2023  4:30 PM Thomas Be PTA [facility]   3/30/2023  4:30 PM Chin Hull, PT [facility]

## 2023-03-06 ENCOUNTER — HOSPITAL ENCOUNTER (OUTPATIENT)
Facility: HOSPITAL | Age: 68
Setting detail: RECURRING SERIES
Discharge: HOME | End: 2023-03-09
Payer: MEDICARE

## 2023-03-06 PROCEDURE — 97112 NEUROMUSCULAR REEDUCATION: CPT

## 2023-03-06 PROCEDURE — 97110 THERAPEUTIC EXERCISES: CPT

## 2023-03-06 NOTE — PROGRESS NOTES
PHYSICAL / OCCUPATIONAL THERAPY - DAILY TREATMENT NOTE (updated )    Patient Name: Shira Gardner    Date: 3/6/2023    : 1955  Insurance: Payor: MEDICARE / Plan: MEDICARE PART A AND B / Product Type: *No Product type* /      Patient  verified yes     Visit #   Current / Total 6 8-16   Time   In / Out 4:30 5:11   Pain   In / Out 3 3   Subjective Functional Status/Changes: _Pt reports some days after his therapy his hips or back will be a little sore, but it depends on the day. Pt states he has pain with sitting, so he tries to not sit a lot. Changes to:  Meds, Allergies, Med Hx, Sx Hx? If yes, update Summary List no       TREATMENT AREA =  Other low back pain [M54.59]    OBJECTIVE    Therapeutic Procedures: Tx Min Billable or 1:1 Min (if diff from Tx Min) Procedure, Rationale, Specifics   26  [id number] Therapeutic Exercise (timed):  increase ROM, strength, coordination, balance, and proprioception to improve patient's ability to progress to PLOF and address remaining functional goals. (see flow sheet as applicable)     Details if applicable:       15  [id number] Neuromuscular Re-Education (timed):  improve balance, coordination, kinesthetic sense, posture, core stability and proprioception to improve patient's ability to develop conscious control of individual muscles and awareness of position of extremities in order to progress to PLOF and address remaining functional goals.  (see flow sheet as applicable)     Details if applicable:     39  [facility] Totals Reminder: bill using total billable min of TIMED therapeutic procedures (example: do not include dry needle or estim unattended, both untimed codes, in totals to left)  8-22 min = 1 unit; 23-37 min = 2 units; 38-52 min = 3 units; 53-67 min = 4 units; 68-82 min = 5 units   Total Total     [x]  Patient Education billed concurrently with other procedures   [x] Review HEP    [] Progressed/Changed HEP, detail:    [] Other detail:       Objective Information/Functional Measures/Assessment    Reviewed lumbar roll to promote neutral spine in sitting. Pt has used one before and demo's understanding of neutral spine in sitting. Pt reports difficulty with the prone exercises. Added prone glute squeeze, prone TKE and prone hip extension for strengthening. Mod Vcing to engage TA and for pelvic stability with  therapeutic exercise. Patient will continue to benefit from skilled PT services to modify and progress therapeutic interventions, analyze and address functional mobility deficits, analyze and address ROM deficits, analyze and address strength deficits, analyze and address soft tissue restrictions, analyze and cue for proper movement patterns, and instruct in home and community integration to address functional deficits and attain remaining goals. Progress toward goals / Updated goals:  []  See Progress Note/Recertification    Short Term Goals: To be accomplished in 2-4 weeks   Patient will be compliant with home exercise program to promote gains with therapy. Status at IE: HEP issued  2. Patient will demonstrate B hip extension strength to at least 3+/5 to improve dynamic stability with gait, ADL's, and functional mobility. Status at IE: 3/5 B continue prone stabilization exercises   3. Patient will demonstrate 90/90 hamstring test to 30 deg or less on the right LE to improve positioning and activity tolerance.    Status at IE: 40 deg    PLAN  yes Continue plan of care  [x]  Upgrade activities as tolerated  []  Discharge due to :  []  Other:    Georges Craig PTA    3/6/2023    4:36 PM    Future Appointments   Date Time Provider Minnie Rodas   3/8/2023  4:30 PM Sander Hernandez, PT [facility]   3/14/2023  4:30 PM Clement Crawford, PT [facility]   3/16/2023  4:30 PM Leandro Trotter, PT [facility]   3/20/2023  4:30 PM Georges Craig PTA [facility]   3/23/2023  4:30 PM Leandro Trotter, PT [facility]   3/27/2023  4:30 PM Karly Rodriguez, [place] [facility]   3/30/2023  4:30 PM Jyoti Page, RAD[place] [facility]

## 2023-03-08 ENCOUNTER — HOSPITAL ENCOUNTER (OUTPATIENT)
Facility: HOSPITAL | Age: 68
Setting detail: RECURRING SERIES
Discharge: HOME | End: 2023-03-11
Payer: MEDICARE

## 2023-03-08 PROCEDURE — 97110 THERAPEUTIC EXERCISES: CPT

## 2023-03-08 NOTE — PROGRESS NOTES
PHYSICAL / OCCUPATIONAL THERAPY - DAILY TREATMENT NOTE (updated )    Patient Name: Fran Hatchet    Date: 3/8/2023    : 1955  Insurance: Payor: MEDICARE / Plan: MEDICARE PART A AND B / Product Type: *No Product type* /      Patient  verified YES    Visit #   Current / Total 7 8-16   Time   In / Out [phone]   Pain   In / Out 2 2   Subjective Functional Status/Changes: Pt will be out for 3 weeks and will call back to RS   Changes to:  Meds, Allergies, Med Hx, Sx Hx? If yes, update Summary List no       TREATMENT AREA =  Other low back pain [M54.59]    OBJECTIVE      Therapeutic Procedures: Tx Min Billable or 1:1 Min (if diff from Tx Min) Procedure, Rationale, Specifics   [phone] [id number] Therapeutic Exercise (timed):  increase ROM, strength, coordination, balance, and proprioception to improve patient's ability to progress to PLOF and address remaining functional goals. (see flow sheet as applicable)     Details if applicable:    See flowsheet     55 46 [facility] Totals Reminder: bill using total billable min of TIMED therapeutic procedures (example: do not include dry needle or estim unattended, both untimed codes, in totals to left)  8-22 min = 1 unit; 23-37 min = 2 units; 38-52 min = 3 units; 53-67 min = 4 units; 68-82 min = 5 units   Total Total     [x]  Patient Education billed concurrently with other procedures   [x] Review HEP    [] Progressed/Changed HEP, detail:    [] Other detail:       Objective Information/Functional Measures/Assessment    Pt tolerated session well without reports of pain and demonstrated no apprehension during movements. Required minimal cuing throughout session. Pt progressing well towards established goals. Goals will be reassessed up return from 3 week hold.      Patient will continue to benefit from skilled PT / OT services to modify and progress therapeutic interventions, analyze and address functional mobility deficits, analyze and address ROM deficits, analyze and address strength deficits, analyze and address soft tissue restrictions, analyze and cue for proper movement patterns, analyze and modify for postural abnormalities, and instruct in home and community integration to address functional deficits and attain remaining goals. Progress toward goals / Updated goals:  []  See Progress Note/Recertification    Increased bridge volume to progress towards STG2 and LTG2    PLAN  yes Continue plan of care  []  Upgrade activities as tolerated  []  Discharge due to :  []  Other:    Akiko Good, PT    3/8/2023    4:32 PM    No future appointments.

## 2023-03-14 ENCOUNTER — APPOINTMENT (OUTPATIENT)
Facility: HOSPITAL | Age: 68
End: 2023-03-14
Payer: MEDICARE

## 2023-03-16 ENCOUNTER — APPOINTMENT (OUTPATIENT)
Facility: HOSPITAL | Age: 68
End: 2023-03-16
Payer: MEDICARE

## 2023-03-20 ENCOUNTER — APPOINTMENT (OUTPATIENT)
Facility: HOSPITAL | Age: 68
End: 2023-03-20
Payer: MEDICARE

## 2023-03-23 ENCOUNTER — APPOINTMENT (OUTPATIENT)
Facility: HOSPITAL | Age: 68
End: 2023-03-23
Payer: MEDICARE

## 2023-03-27 ENCOUNTER — APPOINTMENT (OUTPATIENT)
Facility: HOSPITAL | Age: 68
End: 2023-03-27
Payer: MEDICARE

## 2023-03-30 ENCOUNTER — APPOINTMENT (OUTPATIENT)
Facility: HOSPITAL | Age: 68
End: 2023-03-30
Payer: MEDICARE